# Patient Record
Sex: FEMALE | Race: WHITE
[De-identification: names, ages, dates, MRNs, and addresses within clinical notes are randomized per-mention and may not be internally consistent; named-entity substitution may affect disease eponyms.]

---

## 2017-02-01 ENCOUNTER — HOSPITAL ENCOUNTER (EMERGENCY)
Dept: HOSPITAL 74 - FER | Age: 42
Discharge: HOME | End: 2017-02-01
Payer: COMMERCIAL

## 2017-02-01 VITALS — BODY MASS INDEX: 28.3 KG/M2

## 2017-02-01 VITALS — SYSTOLIC BLOOD PRESSURE: 130 MMHG | DIASTOLIC BLOOD PRESSURE: 71 MMHG | TEMPERATURE: 97.9 F | HEART RATE: 88 BPM

## 2017-02-01 DIAGNOSIS — Z87.442: ICD-10-CM

## 2017-02-01 DIAGNOSIS — Z87.440: ICD-10-CM

## 2017-02-01 DIAGNOSIS — R30.0: Primary | ICD-10-CM

## 2017-02-01 LAB
COLOR UR: YELLOW
PH UR: 7 [PH] (ref 4.5–8)
RBC # UR STRIP: (no result) /UL
SP GR UR: 1.01 (ref 1–1.02)
UROBILINOGEN UR STRIP-MCNC: (no result) MG/DL (ref 0.2–1)

## 2017-02-01 NOTE — PDOC
History of Present Illness





- History of Present Illness


Initial Comments: 





02/01/17 20:41


The patient is a 41 year old female, with a significant past medical history of 

UTI and kidney stones, who presents to the emergency department with burning on 

urination and suprapubic discomfort since yesterday. She states she finished a 

10 day dose of amoxicillin for a UTI 3 days ago and felt her symptoms resolve, 

however, reports her dysuria returned at 4am this morning with new onset of 

bladder pain. She also reports a couple episodes of watery, brown diarrhea 

yesterday. She reports starting ortho tri cyclen-low 2 months ago. She also 

reports recently being tested for STDs and was negative. She states she has had 

adverse reactions to macrobid and usually takes amoxicillin if needed. 





LMP: 1/12/17





She denies chest pain, shortness of breath, headache and dizziness. She denies 

fever, chills, nausea, vomit, diarrhea and constipation. She denies frequency, 

urgency and hematuria. 





OB/Gyn - Dr. Gimenez





<Geetha Orellana - Last Filed: 02/01/17 20:41>





<Aurora Bagley - Last Filed: 02/02/17 03:33>





- General


Chief Complaint: Urinary Problem


Stated Complaint: burning on urination


Time Seen by Provider: 02/01/17 19:33





Past History





<Geetha Orellana - Last Filed: 02/01/17 20:41>





- Past Medical History


Other medical history: denies





- Psycho/Social/Smoking Cessation Hx


Anxiety: No


Suicidal Ideation: No


Smoking History: Never smoked


Have you smoked in the past 12 months: No


Hx Alcohol Use: No


Drug/Substance Use Hx: No


Substance Use Type: None





<Aurora Bagley - Last Filed: 02/02/17 03:33>





- Past Medical History


Allergies/Adverse Reactions: 


 Allergies











Allergy/AdvReac Type Severity Reaction Status Date / Time


 


Sulfa (Sulfonamide Allergy   Verified 02/01/17 19:31





Antibiotics)     











Home Medications: 


Ambulatory Orders





Norgestimate-Ethinyl Estradiol [Ortho Tri-Cyclen Lo Tablet] 1 each PO DAILY 02/ 01/17 











**Review of Systems





- Review of Systems


Able to Perform ROS?: Yes


Comments:: 





02/01/17 20:41





CONSTITUTIONAL: 


Absent: fever, chills, diaphoresis, generalized weakness, malaise, loss of 

appetite


HEENT: 


Absent: rhinorrhea, nasal congestion, throat pain, throat swelling, difficulty 

swallowing,mouth swelling, ear pain, eye pain, visual Changes


CARDIOVASCULAR: 


Absent: chest pain, syncope, palpitations, irregular heart rate, lightheadedness

, peripheral


edema


RESPIRATORY: 


Absent: cough, shortness of breath, dyspnea with exertion, orthopnea, wheezing, 

stridor,


hemoptysis


GASTROINTESTINAL:


Absent: abdominal distension, nausea, vomiting, diarrhea, constipation, melena, 

hematochezia


GENITOURINARY: 


(+) dysuria, suprapubic pain. Absent: frequency, urgency, hesitancy, hematuria, 

flank pain, genital pain


MUSCULOSKELETAL: 


Absent: myalgia, arthralgia, joint swelling


SKIN: 


Absent: rash, itching, pallor


HEMATOLOGIC/IMMUNOLOGIC: 


Absent: easy bleeding, easy bruising, lymphadenopathy, frequent infections


ENDOCRINE:


Absent: unexplained weight gain, unexplained weight loss, heat intolerance, 

cold intolerance


NEUROLOGIC: 


Absent: headache, focal weakness or paresthesias, dizziness, unsteady gait, 

seizure, mental


status changes, bladder or bowel incontinence


PSYCHIATRIC: 


Absent: anxiety, depression, suicidal or homicidal ideation, hallucinations.





<Geetha Orellana - Last Filed: 02/01/17 20:41>





*Physical Exam





- Vital Signs


 Last Vital Signs











Temp Pulse Resp BP Pulse Ox


 


 97.9 F   88   16   130/71   100 


 


 02/01/17 19:31  02/01/17 19:31  02/01/17 19:31  02/01/17 19:31  02/01/17 19:31














- Physical Exam


Comments: 





02/01/17 20:42





GENERAL: The patient is awake, alert, and fully oriented, in no acute distress.


HEAD: Normal with no signs of trauma.


EYES: Pupils equal, round and reactive to light, extraocular movements intact, 

sclera anicteric, conjunctiva clear with no pallor.


ENT: Ears normal, nares patent, oropharynx clear without exudates. Moist mucous 

membranes.


NECK: Normal range of motion, supple without lymphadenopathy, JVD, or masses.


LUNGS: Breath sounds equal, clear to auscultation bilaterally. No wheeze/

crackles.


HEART: Regular rate and rhythm, normal S1 and S2 without murmur or rub.


ABDOMEN: (+) mildly ttp at the LLQ. Soft/nondistended. BS wnl. No guarding or 

rebound. No palpable masses. No hepatosplenomegaly.


EXTREMITIES: Normal range of motion, no edema. No clubbing or cyanosis. No cords

, erythema, or tenderness.


NEUROLOGICAL: Cranial nerves II through XII grossly intact. Normal speech, 

normal gait.


PSYCH: Normal mood, normal affect.


SKIN: Warm, Dry, normal turgor, no rashes or lesions noted.





<Geetha Orellana - Last Filed: 02/01/17 20:41>





- Vital Signs


 Last Vital Signs











Temp Pulse Resp BP Pulse Ox


 


 97.9 F   88   16   130/71   100 


 


 02/01/17 19:31  02/01/17 19:31  02/01/17 19:31  02/01/17 19:31  02/01/17 19:31














<Aurora Bagley - Last Filed: 02/02/17 03:33>





ED Treatment Course





- ADDITIONAL ORDERS


Additional order review: 


 Laboratory  Results











  02/01/17





  19:36


 


Urine Color  Yellow


 


Urine Appearance  Clear


 


Urine pH  7.0  D


 


Ur Specific Gravity  1.010


 


Urine Protein  Negative


 


Urine Glucose (UA)  Negative


 


Urine Ketones  Negative


 


Urine Blood  1+ H


 


Urine Nitrite  Negative


 


Urine Bilirubin  Negative


 


Urine Urobilinogen  0.2 e.u/dl


 


Ur Leukocyte Esterase  Trace














<Geetha Orellana - Last Filed: 02/01/17 20:41>





- ADDITIONAL ORDERS


Additional order review: 


 Laboratory  Results











  02/01/17





  19:36


 


Urine Color  Yellow


 


Urine Appearance  Clear


 


Urine pH  7.0  D


 


Ur Specific Gravity  1.010


 


Urine Protein  Negative


 


Urine Glucose (UA)  Negative


 


Urine Ketones  Negative


 


Urine Blood  1+ H


 


Urine Nitrite  Negative


 


Urine Bilirubin  Negative


 


Urine Urobilinogen  0.2 e.u/dl


 


Ur Leukocyte Esterase  Trace














<Aurora Bagley - Last Filed: 02/02/17 03:33>





Progress Note





- Progress Note


Progress Note: 


Documentation has been prepared under my direction and personally reviewed by 

me in its entirety. I attest that this documented accurately reflects all work, 

treatment, procedures and medical decision making performed by me.





<Aurora Bagley - Last Filed: 02/02/17 03:33>





Medical Decision Making





- Medical Decision Making


As noted above, this 41-year-old woman presents with burning on urination and 

suprapubic discomfort. Patient has just completed a course of amoxicillin for 

the symptoms (patient will but not take any other antibiotic). This was 

prescribed by her gynecologist who has called her to tell her that the urine 

culture did not show significant amounts of bacteria. Although the patient has 

a history of UTI and renal stones in the past, she has never been evaluated by 

a urologist. Exam as noted above.


Urinalysis shows no clear evidence of UTI; microscopic exam shows 0-2 RBC, 5-8 

WBC, few epithelial cells, few bacteria. Urine has been sent for culture and 

sensitivity.





Results discussed with the patient. There is no reason to begin empirical 

antibiotic treatment of her dysuria. Results of urine culture and sensitivity 

will determine if antibiotics are started again. Meanwhile, the patient should 

drink plenty of water and Pyridium 200 mg 3 times a day for 2 days will be 

prescribed.


Since patient has never seen a urologist, she will be given referral 

information for Dr. Rechtschaffen group with whom she should follow-up within 

the next several days. Patient states that she is leaving for [place] on vacation 

in approximately 10 days. She should call the urologist's office tomorrow to 

make an appointment. She should return to the ER if she has any increase in 

severity of her symptoms











<Aurora Bagley - Last Filed: 02/02/17 03:33>





*DC/Admit/Observation/Transfer





- Attestations


Scribe Attestion: 





02/01/17 20:42





Documentation prepared by Geetha Orellana, acting as medical scribe for Aurora Bagley MD





<Geetha Orellana - Last Filed: 02/01/17 20:41>





<Aurora Bagley - Last Filed: 02/02/17 03:33>


Diagnosis at time of Disposition: 


 Dysuria





- Discharge Dispostion


Disposition: HOME


Condition at time of disposition: Stable





- Referrals


Referrals: 


Kenneth Payne MD [Staff Physician] - 





- Patient Instructions


Printed Discharge Instructions:  DI for Dysuria -- Adult


Additional Instructions: 


drink plenty of fluids


pyridium 200mg 3 times a day for 2 days


return to ER if pain worsens


followup with urologist(Dr Elmer stock) within 1 week

## 2017-05-10 ENCOUNTER — HOSPITAL ENCOUNTER (INPATIENT)
Dept: HOSPITAL 74 - FER | Age: 42
LOS: 3 days | Discharge: HOME | DRG: 263 | End: 2017-05-13
Attending: INTERNAL MEDICINE | Admitting: INTERNAL MEDICINE
Payer: COMMERCIAL

## 2017-05-10 VITALS — BODY MASS INDEX: 26.8 KG/M2

## 2017-05-10 DIAGNOSIS — N39.0: ICD-10-CM

## 2017-05-10 DIAGNOSIS — R00.0: ICD-10-CM

## 2017-05-10 DIAGNOSIS — K80.00: Primary | ICD-10-CM

## 2017-05-10 LAB
ALBUMIN SERPL-MCNC: 4 G/DL (ref 3.5–5)
ALP SERPL-CCNC: 54 U/L (ref 32–92)
ALT SERPL-CCNC: 11 U/L (ref 10–40)
ANION GAP SERPL CALC-SCNC: 8 MMOL/L (ref 8–16)
AST SERPL-CCNC: 20 U/L (ref 10–42)
BACTERIA #/AREA URNS HPF: (no result) /HPF
BASOPHILS # BLD: 0.5 % (ref 0–2)
BILIRUB SERPL-MCNC: 0.6 MG/DL (ref 0.2–1)
CALCIUM SERPL-MCNC: 9 MG/DL (ref 8.4–10.2)
CO2 SERPL-SCNC: 25 MMOL/L (ref 22–28)
COLOR UR: YELLOW
CREAT SERPL-MCNC: 0.6 MG/DL (ref 0.6–1.3)
DEPRECATED RDW RBC AUTO: 12.6 % (ref 11.6–15.6)
EOSINOPHIL # BLD: 0.4 % (ref 0–4.5)
GLUCOSE SERPL-MCNC: 110 MG/DL (ref 74–106)
LEUKOCYTE ESTERASE UR QL STRIP.AUTO: (no result)
MCH RBC QN AUTO: 29.5 PG (ref 25.7–33.7)
MCHC RBC AUTO-ENTMCNC: 33.9 G/DL (ref 32–36)
MCV RBC: 87.2 FL (ref 80–96)
NEUTROPHILS # BLD: 79.7 % (ref 42.8–82.8)
PH UR: 7.5 [PH] (ref 4.5–8)
PLATELET # BLD AUTO: 297 K/MM3 (ref 134–434)
PMV BLD: 8.4 FL (ref 7.5–11.1)
PROT SERPL-MCNC: 7.4 G/DL (ref 6.4–8.3)
RBC # BLD AUTO: (no result) /HPF (ref 0–3)
RBC # UR STRIP: (no result) /UL
SP GR UR: 1.01 (ref 1–1.02)
UROBILINOGEN UR STRIP-MCNC: (no result) MG/DL (ref 0.2–1)
WBC # BLD AUTO: 13.8 K/MM3 (ref 4–10.8)
WBC # UR AUTO: (no result) /UL (ref 3–5)

## 2017-05-10 NOTE — PDOC
History of Present Illness





- General


History Source: Patient


Exam Limitations: No Limitations





- History of Present Illness


Initial Comments: 


05/10/17 21:02


The patient is a 41 year old female, with a significant past medical history of 

gallstones, who presents to the emergency department complaining of RUQ for 

approximately 1 day. The patient reports the pain radiates into her back. She 

reports an associated subjective fever, chills, and body aches. The patient 

reports taking 1000 mg tylenol every 7-8 hours and 600 mg ibuprofen, every 4 

hours, with mild relief of symptoms. Patient states she stayed at home from 

work today due to symptoms, and reports hydrating throughout the day. The 

patient denies any nausea, vomiting, diarrhea, or constipation. The patient 

denies any chest pain, shortness of breath, diaphoresis, or palpitations. The 

patient denies any cough, headache, or dizziness





PAST MEDICAL HISTORY:  Gallstones


 


PAST SURGICAL HISTORY:  No significant history


 


FAMILY HISTORY:  No pertinent history


 


SOCIAL HISTORY:  Pt lives with family and is employed.


 


MEDICATIONS:  Reviewed


 


ALLERGIES:  As per nursing notes


 


 


General:  Yes: +fever, +chills, +body aches. No weakness, no weight loss


HEENT: No change in vision. No sore throat. No ear pain


CardioVascular:  No chest pain or shortness of breath


Respiratory: No cough, or wheezing.


Gastrointestinal:  Yes: +RUQ pain radiating into right back. No nausea, vomiting

, diarrhea or constipation.  No rectal bleeding


Genitourinary:  No dysuria, hematuria, or frequency


Musculoskeletal: No joint or muscle swelling


Neurologic: No headache, vertigo, dizziness or loss of consciousness


Psychiatric: No depression


Skin: No rashes or easy bruising


Endocrine: No increased thirst or abnormal weight change


Allergic: No skin or latex allergy


All other systems reviewed and normal


 


 


General: Well-nourished well-developed individual, no acute distress


HEENT: Dry mucous membranes. Throat: Normal, tonsils normal, no erythema or 

exudate


Neck: Supple, no meningeal signs, no lymphadenopathy


Eyes: Pupils equal reactive and round, extraocular motion intact


Chest: Nontender to palpation


Cardiac: Tachycardic, but regular rhythm. S1-S2 normal, no murmurs rubs or 

gallops


Respiratory: Lungs clear to auscultation bilateral


Abdomen: Tenderness on palpation to the RUQ, but no guarding or rebound. Soft, 

nondistended, normal bowel sounds, nontender to palpation in all other quadrants


Extremities: Warm, dry, no cyanosis, clubbing, or edema


Skin: No rashes


Neuro: Alert and oriented x3, nonfocal exam, grossly intact, normal gait


Psych: Normal mood and affect








<Jose F Sims - Last Filed: 05/10/17 21:45>





- General


History Source: Patient


Exam Limitations: No Limitations





- History of Present Illness


Initial Comments: 





05/10/17 23:22





A portion of this note was documented by scribe services under my direction. I 

have reviewed the details of the note, within reason, and agree with the 

documentation.  The case summary and management plan written by me. 








Assessment and plan: This is a 41-year-old female who comes in complaining of 

right upper quadrant pain. Patient was also noted to have a temperature of 103 

in the emergency room. Patient's lab reveals a white count of the left shift. 

Patient had an ultrasound of her gallbladder done that shows some questionable 

thickening of the gallbladder wall and a stone lodged in the neck of the 

gallbladder however no pericholecystic fluid or ductal dilation. Patient also 

did have an incidental finding of a hypokalemic very small spot on her liver 

that will need to be worked at some time most likely as an outpatient.





Discussed with Dr. Stock the general surgeon who well see patient in the 

morning regarding probable removal of her gallbladder. Patient was then 

admitted to Dr. Andrea PARMAR. Discussed case with  immediately will admit 

patient tonight and see patient in the morning as well.





<Lexie Ibarra I - Last Filed: 05/10/17 23:24>





- General


Chief Complaint: Pain, Acute


Stated Complaint: RLQ PAIN AN DFVER


Time Seen by Provider: 05/10/17 20:25





Past History





<Jose F Sims - Last Filed: 05/10/17 21:45>





- Psycho/Social/Smoking Cessation Hx


Anxiety: No


Suicidal Ideation: No


Smoking History: Never smoked


Have you smoked in the past 12 months: No


Hx Alcohol Use: No


Drug/Substance Use Hx: No


Substance Use Type: None





<Lexie Ibarra - Last Filed: 05/10/17 23:24>





- Past Medical History


Allergies/Adverse Reactions: 


 Allergies











Allergy/AdvReac Type Severity Reaction Status Date / Time


 


Sulfa (Sulfonamide Allergy   Verified 02/01/17 19:31





Antibiotics)     


 


ciprofloxacin AdvReac   Verified 05/10/17 21:09


 


latex AdvReac   Verified 05/10/17 21:09


 


nitrofurantoin AdvReac  Difficulty Verified 05/10/17 21:09





[From Macrobid]   Breathing  


 


nitrofurantoin AdvReac  Difficulty Verified 05/10/17 21:09





macrocrystalline   Breathing  





[From Macrobid]     











Home Medications: 


Ambulatory Orders





Norgestimate-Ethinyl Estradiol [Ortho Tri-Cyclen Lo Tablet] 1 each PO DAILY 02/ 01/17 


Cranberry 0 mg PO DAILY 05/10/17 


Multivitamin [Poly-Vitamin] 1 each PO DAILY 05/10/17 











*Physical Exam





- Vital Signs


 Last Vital Signs











Temp Pulse Resp BP Pulse Ox


 


 103.2 F H  130 H  20   121/75   100 


 


 05/10/17 20:39  05/10/17 20:24  05/10/17 20:24  05/10/17 20:56  05/10/17 20:24














<Jose F Sims - Last Filed: 05/10/17 21:45>





**Heart Score/ECG Review





- ECG Impressions


Comment:: 





05/10/17 21:10


Vent Rate: 138 bpm


IMPRESSION: Sinus tachycardia. Nonspecific ST and T wave abnormality. 





<Jose F Sims - Last Filed: 05/10/17 21:45>





ED Treatment Course





- LABORATORY


CBC & Chemistry Diagram: 


 05/10/17 20:45





 05/10/17 20:45





- ADDITIONAL ORDERS


Additional order review: 


 Laboratory  Results











  05/10/17





  20:40


 


Urine Color  Yellow


 


Urine Appearance  Clear


 


Urine pH  7.5


 


Ur Specific Gravity  1.010


 


Urine Protein  Negative


 


Urine Glucose (UA)  Negative


 


Urine Ketones  Negative


 


Urine Blood  1+ H


 


Urine Nitrite  Negative


 


Urine Bilirubin  Negative


 


Urine Urobilinogen  0.2 e.u/dl


 


Ur Leukocyte Esterase  1+ H


 


Urine RBC  0-2


 


Urine WBC  5-10


 


Urine Bacteria  Few


 


Urine HCG, Qual  Negative














<Jose F Sims - Last Filed: 05/10/17 21:45>





- LABORATORY


CBC & Chemistry Diagram: 


 05/10/17 20:45





 05/10/17 20:45





<Lexie Ibarra - Last Filed: 05/10/17 23:24>





*DC/Admit/Observation/Transfer





- Attestations


Scribe Attestion: 





05/10/17 21:02





Documentation prepared by Jose F Sims, acting as medical scribe for 

Lexie Ibarra MD.





<Jose F Sims - Last Filed: 05/10/17 21:45>





- Discharge Dispostion


Admit: Yes





<Lexie Ibarra - Last Filed: 05/10/17 23:24>


Diagnosis at time of Disposition: 


Cholelithiasis with acute cholecystitis


Qualifiers:


 Cholelithiasis location: gallbladder Biliary obstruction: with biliary 

obstruction Qualified Code(s): K80.01 - Calculus of gallbladder with acute 

cholecystitis with obstruction





- Discharge Dispostion


Condition at time of disposition: Fair

## 2017-05-11 LAB
INR BLD: 1.16 (ref 0.82–1.09)
PT PNL PPP: 13 SEC (ref 10.2–13)

## 2017-05-11 RX ADMIN — PANTOPRAZOLE SODIUM SCH MG: 40 INJECTION, POWDER, FOR SOLUTION INTRAVENOUS at 09:33

## 2017-05-11 RX ADMIN — PIPERACILLIN SODIUM,TAZOBACTAM SODIUM SCH MLS/HR: 3; .375 INJECTION, POWDER, FOR SOLUTION INTRAVENOUS at 17:30

## 2017-05-11 RX ADMIN — ACETAMINOPHEN ONE MG: 325 TABLET ORAL at 06:30

## 2017-05-11 RX ADMIN — HYDROMORPHONE HYDROCHLORIDE PRN MG: 1 INJECTION, SOLUTION INTRAMUSCULAR; INTRAVENOUS; SUBCUTANEOUS at 05:43

## 2017-05-11 RX ADMIN — PIPERACILLIN SODIUM,TAZOBACTAM SODIUM SCH MLS/HR: 3; .375 INJECTION, POWDER, FOR SOLUTION INTRAVENOUS at 15:16

## 2017-05-11 RX ADMIN — ACETAMINOPHEN ONE MG: 325 TABLET ORAL at 06:50

## 2017-05-11 RX ADMIN — HYDROMORPHONE HYDROCHLORIDE PRN MG: 1 INJECTION, SOLUTION INTRAMUSCULAR; INTRAVENOUS; SUBCUTANEOUS at 15:15

## 2017-05-11 RX ADMIN — ACETAMINOPHEN PRN MG: 10 INJECTION, SOLUTION INTRAVENOUS at 21:25

## 2017-05-11 NOTE — CON.CARD
Consult


Consult Specialty:: Cardiology


Referred by:: Dr. Morris


Reason for Consultation:: Preop evaluation





- History of Present Illness


Chief Complaint: RUQ pain


History of Present Illness: 


41F admitted with fever, RUQ pain, elevated WBC and US showing gallstones and 

findings in GB c/w acute cholecystitis.


Plan is for surgery this evening.





Called to evaluate for sinus tach.





Patient has no cardiac history: denies CAD, prior MI or syncope. No h/o CHF.


Several years ago had been referred to cardiologist after being admitted with 

UTI at [facility]. In that setting had elevated heart rate, 

which sounds like sinus tachycardia. Her work up at that time was reportedly 

unremarkable.





She denies exertional CP, SOB, Palps, LE edema, PND or orthopnea.





- History Source


History Provided By: Patient, Medical Record


Limitations to Obtaining History: No Limitations





- Past Medical History


Cardio/Vascular: No: AFIB, Aneurysm, Aortic Insufficiency, Aortic Stenosis, CAD

, CHF, Deep Vein Thrombosis, HTN, Hyperlipdemia, MI, Mitral Insufficiency, 

Mitral Stenosis, Murmur, Pulmonary Hypertension, Other


Pulmonary: No: Asthma, Bronchitis, Cancer, COPD, O2 Dependent, Pneumonia, 

Previously Intubated, Pulmonary Embolus, Pulmonary Fibrosis, Sleep Apnea, Other


Gastrointestinal: No: Ascites, Cancer, Constipation, Crohn's Disease, 

Diverticulitis, Diverticulosis, Esophageal Varices, Gastritis, GERD, GI Bleed, 

Hemorrhoids, Hiatal Hernia, Inflamatory Bowel Disease, Irritable Bowel Disease, 

Pancreatitis, Peptic Ulcer Disease, Ulcerative Colitis, Other


Hepatobiliary: No: Cirrhosis, Cholelithiasis, Cholecystitis, Choledocholithiasis

, Hepatitis A, Hepatitis B, Hepatitis C, Other


Renal/: No: Renal Failure, Renal Inusuff, BPH, Cancer, Hematuria, Hemodialysis

, Neurogenic Bladder, Renal Calculi, UTI, Other


...LMP: 04/30/17


...Pregnant: No


Heme/Onc: No: Anemia, B12 Deficiency, Bleeding Disorder, Cancer, Current 

Chemotherapy, Current Radiation Therapy, Hemochromatosis, Hypercoaguable State, 

Myeloproliferative Synd, Sickle Cell Disease, Sickle Cell Trait, 

Thrombocytopenia, Other


Infectious Disease: No: AIDS, C-Diff, Herpes Zoster, HIV, MRSA, STD's, 

Tuberculosis, VREF, Other


Psych: No: Addictions, Anxiety, Bipolar, Depression, Panic, Psychosis, 

Schizophrenia, Other


Musculoskeletal: No: Bursitis, Chronic low back pain, Hemiparesis, Hemiplegia, 

Osteoarthritis, Paraplegia, Other


Rheumatology: No: Fibromyalgia, Gout, Lupus, Rheumatoid Arthritis, Sarcoidosis, 

Vasculitis, Other


ENT: No: Allergic Rhinitis, Sinusitis, Other


Endocrine: No: Kayode's Disease, Cushing's Disease, Diabetes Insipidus, 

Diabetes Mellitus, Hyperparathyroidism, Hyperthyroidism, Hypothyroidism, 

Osteopenia, SIADH, Other


Dermatology: No: Basal Cell, Cellulitis, Eczema, Melanoma, Psoriasis, Squamous 

Cell, Other


Additional Medical History: Benign fatty tissue removed from breast 18 years ago





- Past Surgical History


Additional Surgical History: as above.  Denies bleeding tendencies/disorders.





- Alcohol/Substance Use


Hx Alcohol Use: No


History of Substance Use: reports: None





- Smoking History


Smoking history: Never smoked





- Social History


Occupation: rehabbing stroke patients.


History of Recent Travel: No





Home Medications





- Allergies


Allergies/Adverse Reactions: 


 Allergies











Allergy/AdvReac Type Severity Reaction Status Date / Time


 


Sulfa (Sulfonamide Allergy   Verified 02/01/17 19:31





Antibiotics)     


 


ciprofloxacin AdvReac   Verified 05/10/17 21:09


 


latex AdvReac   Verified 05/10/17 21:09


 


nitrofurantoin AdvReac  Difficulty Verified 05/10/17 21:09





[From Macrobid]   Breathing  


 


nitrofurantoin AdvReac  Difficulty Verified 05/10/17 21:09





macrocrystalline   Breathing  





[From Macrobid]     














- Home Medications


Home Medications: 


Ambulatory Orders





Norgestimate-Ethinyl Estradiol [Ortho Tri-Cyclen Lo Tablet] 1 each PO DAILY 02/ 01/17 


Cranberry 0 mg PO DAILY 05/10/17 


Multivitamin [Poly-Vitamin] 1 each PO DAILY 05/10/17 











Family Disease History





- Family Disease History


Family History: Unremarkable (no early CAD or SCD)





Review of Systems


Findings/Remarks: 


Denies exertional CP, SOB, palpitations, PND, orthopnea.


Denies syncope. 








- Review of Systems


Constitutional: reports: Chills


Cardiovascular: reports: No Symptoms


Respiratory: reports: No Symptoms


Gastrointestinal: reports: Abdominal Pain (RUQ pain and tenderness)


Genitourinary: reports: No Symptoms


Musculoskeletal: reports: No Symptoms


Integumentary: reports: No Symptoms


Neurological: reports: No Symptoms


Endocrine: reports: No Symptoms


Hematology/Lymphatic: reports: No Symptoms


Psychiatric: reports: No Symptoms





- Risk Factors


Known Risk Factors: No: Age, Diabetes Mellitus, Family History, Gender, 

Hypercholesterolemia, Hypertension, Physical Inactivity, Prior MI /Emb Stroke, 

Race, Smoking, Other


Vital Signs: 


 Vital Signs











Temperature  100.7 F H  05/11/17 14:15


 


Pulse Rate  120 H  05/11/17 14:15


 


Respiratory Rate  18   05/11/17 14:15


 


Blood Pressure  104/60   05/11/17 14:15


 


O2 Sat by Pulse Oximetry (%)  98   05/11/17 14:15











Constitutional: Yes: Well Nourished


Eyes: Yes: Conjunctiva Clear


HENT: Yes: Atraumatic, Normocephalic


Neck: Yes: Supple, Trachea Midline


Respiratory: Yes: CTA Bilaterally


Gastrointestinal: Yes: Other (+ Slaughter's sign. + tenderness RUQ.)


Cardiovascular: Yes: Regular Rate and Rhythm (S1, 2. RRR. No murmurs.)


JVD: No


Carotid Bruit: No


PMI: Non-Displaced


Heart Sounds: Yes: S1, S2 (RRR, no murmurs.)


Edema: No


Peripheral Pulses WNL: Yes


Integumentary: Yes: WNL


Neurological: Yes: Alert, Oriented


...Motor Strength: WNL


Psychiatric: Yes: WNL





- Other Data


Labs, Other Data: 


 INR, PTT











INR  1.16  (0.82-1.09)   05/11/17  07:50    








 Laboratory Tests











  05/10/17 05/10/17 05/10/17





  20:40 20:45 20:45


 


WBC   13.8 H 


 


Hgb   12.6 


 


Hct   37.2 


 


Plt Count   297 


 


INR   


 


Sodium    137


 


Potassium    3.6


 


BUN    5 L D


 


Creatinine    0.6  D


 


Creat Clearance w eGFR    > 60


 


Random Glucose    110 H


 


Lactic Acid   


 


Calcium    9.0


 


Total Bilirubin    0.6


 


AST    20


 


ALT    11


 


Alkaline Phosphatase    54  D


 


Total Protein    7.4


 


Albumin    4.0


 


Lipase    33


 


Urine Appearance  Clear  


 


Urine pH  7.5  


 


Ur Specific Gravity  1.010  


 


Urine Blood  1+ H  














  05/10/17 05/11/17





  21:00 07:50


 


WBC  


 


Hgb  


 


Hct  


 


Plt Count  


 


INR   1.16


 


Sodium  


 


Potassium  


 


BUN  


 


Creatinine  


 


Creat Clearance w eGFR  


 


Random Glucose  


 


Lactic Acid  0.957 


 


Calcium  


 


Total Bilirubin  


 


AST  


 


ALT  


 


Alkaline Phosphatase  


 


Total Protein  


 


Albumin  


 


Lipase  


 


Urine Appearance  


 


Urine pH  


 


Ur Specific Gravity  


 


Urine Blood  











Sinus tachycardia 138bpm NSST changes ( 5-10-17)


Echo: Pending





Imaging





- Results


Ultrasound: Report Reviewed


EKG: Image Reviewed





Problem List





- Problems


(1) Cholecystitis, acute with cholelithiasis


Assessment/Plan: 


-fever, leukocytosis, RUQ pain and US c/w inflammed GB with stones.


-For cholecystectomy today


-There are no cardiac contraindications, can proceed without need for further 

cardiac diagnostic testing.


She has no anginal symptoms, no CHF hx and there is no murmur of AS.


She would be considered at low operative risk for a low to intermediate risk 

surgery.


Code(s): K80.00 - CALCULUS OF GALLBLADDER W ACUTE CHOLECYST W/O OBSTRUCTION   

Qualifiers: 


     Cholelithiasis location: gallbladder     Biliary obstruction: with biliary 

obstruction     Qualified Code(s): K80.01 - Calculus of gallbladder with acute 

cholecystitis with obstruction  





(2) Sinus tachycardia


Assessment/Plan: 


-physiologic response to fever, infection and pain


-hydrate


-antibiotics as per surgery


-For cholecystectomy


-analgesia


-echo is planned, but should not delay surgery as physical exam does not 

suggest any significant valve disease. 


Code(s): R00.0 - TACHYCARDIA, UNSPECIFIED

## 2017-05-11 NOTE — HP
Admitting History and Physical





- Primary Care Physician


PCP: Claudy Morris





- Admission


History of Present Illness: 


41 year old female, with a significant past medical history of gallstones, who 

presents to the emergency department complaining of RUQ for approximately 1 

day. The patient reports the pain radiates into her back. She reports an 

associated subjective fever, chills, and body aches. The patient reports taking 

1000 mg tylenol every 7-8 hours and 600 mg ibuprofen, every 4 hours, with mild 

relief of symptoms





-





- Past Medical History


...LMP: 04/30/17


...Pregnant: No





- Advance Directives


Advance Directives: Yes: Living Will





- Smoking History


Smoking history: Never smoked


Have you smoked in the past 12 months: No





- Alcohol/Substance Use


Hx Alcohol Use: No





Home Medications





- Allergies


Allergies/Adverse Reactions: 


 Allergies











Allergy/AdvReac Type Severity Reaction Status Date / Time


 


Sulfa (Sulfonamide Allergy   Verified 02/01/17 19:31





Antibiotics)     


 


ciprofloxacin AdvReac   Verified 05/10/17 21:09


 


latex AdvReac   Verified 05/10/17 21:09


 


nitrofurantoin AdvReac  Difficulty Verified 05/10/17 21:09





[From Macrobid]   Breathing  


 


nitrofurantoin AdvReac  Difficulty Verified 05/10/17 21:09





macrocrystalline   Breathing  





[From Macrobid]     














- Home Medications


Home Medications: 


Ambulatory Orders





Norgestimate-Ethinyl Estradiol [Ortho Tri-Cyclen Lo Tablet] 1 each PO DAILY 02/ 01/17 


Cranberry 0 mg PO DAILY 05/10/17 


Multivitamin [Poly-Vitamin] 1 each PO DAILY 05/10/17 











Physical Examination


Vital Signs: 


 Vital Signs











Temperature  100.0 F H  05/11/17 11:41


 


Pulse Rate  120 H  05/11/17 06:32


 


Respiratory Rate  18   05/11/17 06:32


 


Blood Pressure  102/59   05/11/17 06:32


 


O2 Sat by Pulse Oximetry (%)  100   05/11/17 08:58











Constitutional: Yes: No Distress


HENT: Yes: Atraumatic


Neck: Yes: Supple


Cardiovascular: Yes: Regular Rate and Rhythm


Respiratory: Yes: CTA Bilaterally


Gastrointestinal: Yes: Normal Bowel Sounds


Extremities: Yes: WNL


Neurological: Yes: Alert, Oriented





Problem List





- Problems


(1) Cholecystitis, acute with cholelithiasis


Assessment/Plan: 


npo, ivf


iv abx


surgery consult


Code(s): K80.00 - CALCULUS OF GALLBLADDER W ACUTE CHOLECYST W/O OBSTRUCTION   

Qualifiers: 


     Cholelithiasis location: gallbladder     Biliary obstruction: with biliary 

obstruction     Qualified Code(s): K80.01 - Calculus of gallbladder with acute 

cholecystitis with obstruction  








Assessment/Plan


 Laboratory Tests











  05/10/17 05/10/17 05/10/17





  20:40 20:45 20:45


 


WBC   13.8 H 


 


RBC   4.27 


 


Hgb   12.6 


 


Hct   37.2 


 


MCV   87.2 


 


MCHC   33.9 


 


RDW   12.6 


 


Plt Count   297 


 


MPV   8.4 


 


Neutrophils %   79.7 


 


Lymphocytes %   13.7  D 


 


Monocytes %   5.7 


 


Eosinophils %   0.4 


 


Basophils %   0.5 


 


INR   


 


Sodium    137


 


Potassium    3.6


 


Chloride    104


 


Carbon Dioxide    25


 


Anion Gap    8


 


BUN    5 L D


 


Creatinine    0.6  D


 


Creat Clearance w eGFR    > 60


 


Random Glucose    110 H


 


Lactic Acid   


 


Calcium    9.0


 


Total Bilirubin    0.6


 


AST    20


 


ALT    11


 


Alkaline Phosphatase    54  D


 


Total Protein    7.4


 


Albumin    4.0


 


Lipase    33


 


Urine Color  Yellow  


 


Urine Appearance  Clear  


 


Urine pH  7.5  


 


Ur Specific Gravity  1.010  


 


Urine Protein  Negative  


 


Urine Glucose (UA)  Negative  


 


Urine Ketones  Negative  


 


Urine Blood  1+ H  


 


Urine Nitrite  Negative  


 


Urine Bilirubin  Negative  


 


Urine Urobilinogen  0.2 e.u/dl  


 


Ur Leukocyte Esterase  1+ H  


 


Urine RBC  0-2  


 


Urine WBC  5-10  


 


Urine Bacteria  Few  


 


Urine HCG, Qual  Negative  


 


Blood Type   


 


Antibody Screen   














  05/10/17 05/11/17 05/11/17





  21:00 07:50 10:55


 


WBC   


 


RBC   


 


Hgb   


 


Hct   


 


MCV   


 


MCHC   


 


RDW   


 


Plt Count   


 


MPV   


 


Neutrophils %   


 


Lymphocytes %   


 


Monocytes %   


 


Eosinophils %   


 


Basophils %   


 


INR   1.16 


 


Sodium   


 


Potassium   


 


Chloride   


 


Carbon Dioxide   


 


Anion Gap   


 


BUN   


 


Creatinine   


 


Creat Clearance w eGFR   


 


Random Glucose   


 


Lactic Acid  0.957  


 


Calcium   


 


Total Bilirubin   


 


AST   


 


ALT   


 


Alkaline Phosphatase   


 


Total Protein   


 


Albumin   


 


Lipase   


 


Urine Color   


 


Urine Appearance   


 


Urine pH   


 


Ur Specific Gravity   


 


Urine Protein   


 


Urine Glucose (UA)   


 


Urine Ketones   


 


Urine Blood   


 


Urine Nitrite   


 


Urine Bilirubin   


 


Urine Urobilinogen   


 


Ur Leukocyte Esterase   


 


Urine RBC   


 


Urine WBC   


 


Urine Bacteria   


 


Urine HCG, Qual   


 


Blood Type    O POSITIVE


 


Antibody Screen    Negative








Active Medications











Generic Name Dose Route Start Last Admin





  Trade Name Freq  PRN Reason Stop Dose Admin


 


Acetaminophen  1,000 mg 05/11/17 08:35  





  Ofirmev Injection -  IVPB   





  Q6H PRN   


 


Hydromorphone HCl  1 mg 05/11/17 03:11  





  Dilaudid Injection -  IVPB   





  Q4H PRN   


 


Sodium Chloride  1,000 mls @ 75 mls/hr 05/11/17 08:30 05/11/17 09:36





  Normal Saline -  IV   75 mls/hr





  ASDIR AMI   Administration


 


Pantoprazole Sodium  40 mg 05/11/17 10:00 05/11/17 09:33





  Protonix 40mg Ivpb (Pre-Docked)  IVPB   40 mg





  DAILY AMI   Administration








PLAN


CARDIAC ECHO


CARDIOLOGY CLEARANCE FOR SURGERY

## 2017-05-11 NOTE — CONSULT
Consult


Consult Specialty:: infectious diseases


Reason for Consultation:: choleycystitis





- History of Present Illness


Chief Complaint: abd pain ruq


History of Present Illness: 


41 year old female, with a significant past medical history of gallstones,

admitted for  RUQ pain for approximately 1 day. The patient reports the pain 

radiates into her back. She reports an associated subjective fever, chills, and 

body aches. 


patient has taken pain medications without relief and came to the hospital


patient is otherwise relatively healthy


patient is diagnosed as having ac choleycystitis and plan is for surgery 

tomorrow 





- History Source


History Provided By: Patient


Limitations to Obtaining History: No Limitations





- Past Medical History


Cardio/Vascular: No: AFIB, Aneurysm, Aortic Insufficiency, Aortic Stenosis, CAD

, CHF, Deep Vein Thrombosis, HTN, Hyperlipdemia, MI, Mitral Insufficiency, 

Mitral Stenosis, Murmur, Pulmonary Hypertension, Other


Pulmonary: No: Asthma, Bronchitis, Cancer, COPD, O2 Dependent, Pneumonia, 

Previously Intubated, Pulmonary Embolus, Pulmonary Fibrosis, Sleep Apnea, Other


Gastrointestinal: No: Ascites, Cancer, Constipation, Crohn's Disease, 

Diverticulitis, Diverticulosis, Esophageal Varices, Gastritis, GERD, GI Bleed, 

Hemorrhoids, Hiatal Hernia, Inflamatory Bowel Disease, Irritable Bowel Disease, 

Pancreatitis, Peptic Ulcer Disease, Ulcerative Colitis, Other


Hepatobiliary: No: Cirrhosis, Cholelithiasis, Cholecystitis, Choledocholithiasis

, Hepatitis A, Hepatitis B, Hepatitis C, Other


Renal/: No: Renal Failure, Renal Inusuff, BPH, Cancer, Hematuria, Hemodialysis

, Neurogenic Bladder, Renal Calculi, UTI, Other


...LMP: 04/30/17


...Pregnant: No


Infectious Disease: No: AIDS, C-Diff, Herpes Zoster, HIV, MRSA, STD's, 

Tuberculosis, VREF, Other


Psych: No: Addictions, Anxiety, Bipolar, Depression, Panic, Psychosis, 

Schizophrenia, Other


Musculoskeletal: No: Bursitis, Chronic low back pain, Hemiparesis, Hemiplegia, 

Osteoarthritis, Paraplegia, Other


Rheumatology: No: Fibromyalgia, Gout, Lupus, Rheumatoid Arthritis, Sarcoidosis, 

Vasculitis, Other


ENT: No: Allergic Rhinitis, Sinusitis, Other


Endocrine: No: Norris's Disease, Cushing's Disease, Diabetes Insipidus, 

Diabetes Mellitus, Hyperparathyroidism, Hyperthyroidism, Hypothyroidism, 

Osteopenia, SIADH, Other


Dermatology: No: Basal Cell, Cellulitis, Eczema, Melanoma, Psoriasis, Squamous 

Cell, Other


Additional Medical History: Benign fatty tissue removed from breast 18 years ago





- Past Surgical History


Additional Surgical History: as above.  Denies bleeding tendencies/disorders.





- Alcohol/Substance Use


Hx Alcohol Use: No


History of Substance Use: reports: None





- Smoking History


Smoking history: Never smoked


Have you smoked in the past 12 months: No





- Social History


Occupation: rehabbing stroke patients.


History of Recent Travel: No





Home Medications





- Allergies


Allergies/Adverse Reactions: 


 Allergies











Allergy/AdvReac Type Severity Reaction Status Date / Time


 


Sulfa (Sulfonamide Allergy   Verified 02/01/17 19:31





Antibiotics)     


 


ciprofloxacin AdvReac   Verified 05/10/17 21:09


 


latex AdvReac   Verified 05/10/17 21:09


 


nitrofurantoin AdvReac  Difficulty Verified 05/10/17 21:09





[From Macrobid]   Breathing  


 


nitrofurantoin AdvReac  Difficulty Verified 05/10/17 21:09





macrocrystalline   Breathing  





[From Macrobid]     














- Home Medications


Home Medications: 


Ambulatory Orders





Norgestimate-Ethinyl Estradiol [Ortho Tri-Cyclen Lo Tablet] 1 each PO DAILY 02/ 01/17 


Cranberry 0 mg PO DAILY 05/10/17 


Multivitamin [Poly-Vitamin] 1 each PO DAILY 05/10/17 











Review of Systems





- Review of Systems


Constitutional: reports: No Symptoms


Eyes: reports: No Symptoms


HENT: reports: No Symptoms


Neck: reports: No Symptoms


Cardiovascular: reports: No Symptoms


Respiratory: reports: No Symptoms


Gastrointestinal: reports: Abdominal Pain, Other


Genitourinary: reports: No Symptoms


Musculoskeletal: reports: No Symptoms


Integumentary: reports: No Symptoms


Neurological: reports: No Symptoms


Endocrine: reports: No Symptoms


Hematology/Lymphatic: reports: No Symptoms


Psychiatric: reports: No Symptoms





Physical Exam


Vital Signs: 


 Vital Signs











Temperature  100.7 F H  05/11/17 14:15


 


Pulse Rate  120 H  05/11/17 14:15


 


Respiratory Rate  18   05/11/17 14:15


 


Blood Pressure  104/60   05/11/17 14:15


 


O2 Sat by Pulse Oximetry (%)  98   05/11/17 14:15











Constitutional: Yes: Well Nourished, Calm, Moderate Distress


Eyes: Yes: Conjunctiva Clear


HENT: Yes: Atraumatic


Neck: Yes: Supple, Trachea Midline


Cardiovascular: Yes: Regular Rate and Rhythm


Respiratory: Yes: Regular, CTA Bilaterally


Gastrointestinal: Yes: Soft, Hypoactive Bowel Sounds, Tenderness (ruq), Other


Musculoskeletal: Yes: WNL


Extremities: Yes: WNL


Neurological: Yes: Alert, Oriented


Psychiatric: Yes: Alert, Oriented





Imaging





- Results


Ultrasound: Report Reviewed, Image Reviewed





Assessment/Plan


 Problems


(1) Cholecystitis, acute with cholelithiasis


Code(s): K80.00 - CALCULUS OF GALLBLADDER W ACUTE CHOLECYST W/O OBSTRUCTION   

Qualifiers: 


     Cholelithiasis location: gallbladder     Biliary obstruction: with biliary 

obstruction     Qualified Code(s): K80.01 - Calculus of gallbladder with acute 

cholecystitis with obstruction  





plan





started patient on abx


for surgery


rest continue current mgmt

## 2017-05-11 NOTE — EKG
Test Reason : 

Blood Pressure : ***/*** mmHG

Vent. Rate : 138 BPM     Atrial Rate : 138 BPM

   P-R Int : 134 ms          QRS Dur : 080 ms

    QT Int : 274 ms       P-R-T Axes : 064 019 052 degrees

   QTc Int : 415 ms

 

SINUS TACHYCARDIA

NONSPECIFIC ST AND T WAVE ABNORMALITY

NO PREVIOUS ECGS AVAILABLE

Confirmed by ESSENCE FAUST MD ([id number]) on 5/11/2017 12:07:04 PM

 

Referred By: MD FOFANA           Confirmed By:ESSENCE FAUST MD

## 2017-05-11 NOTE — CONSULT
Consult


Reason for Consultation:: abdominal pain





- History of Present Illness


History of Present Illness: 


41 year old female, with a significant past medical history of gallstones, who 

presents to the emergency department complaining of RUQ for approximately 1 

day. The patient reports the pain radiates into her back. She reports an 

associated subjective fever, chills, and body aches. The patient reports taking 

1000 mg tylenol every 7-8 hours and 600 mg ibuprofen, every 4 hours, with mild 

relief of symptoms





- Past Medical History


...LMP: 04/30/17


...Pregnant: No





- Alcohol/Substance Use


Hx Alcohol Use: No





- Smoking History


Smoking history: Never smoked


Have you smoked in the past 12 months: No





Home Medications





- Allergies


Allergies/Adverse Reactions: 


 Allergies











Allergy/AdvReac Type Severity Reaction Status Date / Time


 


Sulfa (Sulfonamide Allergy   Verified 02/01/17 19:31





Antibiotics)     


 


ciprofloxacin AdvReac   Verified 05/10/17 21:09


 


latex AdvReac   Verified 05/10/17 21:09


 


nitrofurantoin AdvReac  Difficulty Verified 05/10/17 21:09





[From Macrobid]   Breathing  


 


nitrofurantoin AdvReac  Difficulty Verified 05/10/17 21:09





macrocrystalline   Breathing  





[From Macrobid]     














- Home Medications


Home Medications: 


Ambulatory Orders





Norgestimate-Ethinyl Estradiol [Ortho Tri-Cyclen Lo Tablet] 1 each PO DAILY 02/ 01/17 


Cranberry 0 mg PO DAILY 05/10/17 


Multivitamin [Poly-Vitamin] 1 each PO DAILY 05/10/17 











Physical Exam


Vital Signs: 


 Vital Signs











Temperature  99.1 F   05/11/17 00:56


 


Pulse Rate  126 H  05/11/17 00:56


 


Respiratory Rate  20   05/11/17 00:56


 


Blood Pressure  118/65   05/11/17 00:56


 


O2 Sat by Pulse Oximetry (%)  98   05/11/17 00:56














Imaging





- Results


Ultrasound: Report Reviewed





Problem List





- Problems


(1) Cholecystitis, acute with cholelithiasis


Code(s): K80.00 - CALCULUS OF GALLBLADDER W ACUTE CHOLECYST W/O OBSTRUCTION   

Qualifiers: 


     Cholelithiasis location: gallbladder     Biliary obstruction: with biliary 

obstruction     Qualified Code(s): K80.01 - Calculus of gallbladder with acute 

cholecystitis with obstruction  








Assessment/Plan


41 yr old female  with ?acute cholecystitis/ biliary colic


recommended cholecystectomy discussed the options with patient


will plan for this evening

## 2017-05-12 PROCEDURE — 0FT44ZZ RESECTION OF GALLBLADDER, PERCUTANEOUS ENDOSCOPIC APPROACH: ICD-10-PCS | Performed by: SURGERY

## 2017-05-12 RX ADMIN — PANTOPRAZOLE SODIUM SCH MG: 40 INJECTION, POWDER, FOR SOLUTION INTRAVENOUS at 09:43

## 2017-05-12 RX ADMIN — PIPERACILLIN SODIUM,TAZOBACTAM SODIUM SCH: 3; .375 INJECTION, POWDER, FOR SOLUTION INTRAVENOUS at 18:54

## 2017-05-12 RX ADMIN — PIPERACILLIN SODIUM,TAZOBACTAM SODIUM SCH MLS/HR: 3; .375 INJECTION, POWDER, FOR SOLUTION INTRAVENOUS at 02:00

## 2017-05-12 RX ADMIN — ACETAMINOPHEN PRN MG: 10 INJECTION, SOLUTION INTRAVENOUS at 18:50

## 2017-05-12 RX ADMIN — PIPERACILLIN SODIUM,TAZOBACTAM SODIUM SCH MLS/HR: 3; .375 INJECTION, POWDER, FOR SOLUTION INTRAVENOUS at 09:43

## 2017-05-12 NOTE — PN
Progress Note, Physician


Chief Complaint: 


ambulating, no acute distress


Feels "Little better"


No SOB


History of Present Illness: 


blood cultures remain negative





- Current Medication List


Current Medications: 


Active Medications





Acetaminophen (Ofirmev Injection -)  1,000 mg IVPB Q6H PRN


   Last Admin: 05/11/17 21:25 Dose:  1,000 mg


Hydromorphone HCl (Dilaudid Injection -)  1 mg IVPB Q4H PRN


   Last Admin: 05/11/17 15:15 Dose:  1 mg


Sodium Chloride (Normal Saline -)  1,000 mls @ 75 mls/hr IV ASDIR AMI


   Last Admin: 05/11/17 09:36 Dose:  75 mls/hr


Piperacillin Sod/Tazobactam Sod (Zosyn 3.375gm Ivpb (Pre-Docked))  50 mls @ 100 

mls/hr IVPB Q8H-IV AMI


   PRN Reason: Protocol


   Last Admin: 05/12/17 02:00 Dose:  100 mls/hr


Pantoprazole Sodium (Protonix 40mg Ivpb (Pre-Docked))  40 mg IVPB DAILY [facility]


   Last Admin: 05/11/17 09:33 Dose:  40 mg











- Objective


Vital Signs: 


 Vital Signs











Temperature  98.5 F   05/12/17 05:43


 


Pulse Rate  86   05/12/17 05:43


 


Respiratory Rate  19   05/12/17 08:18


 


Blood Pressure  80/44   05/12/17 05:43


 


O2 Sat by Pulse Oximetry (%)  100   05/12/17 08:18











Constitutional: Yes: No Distress, Calm


Eyes: Yes: Conjunctiva Clear


HENT: Yes: Atraumatic


Neck: Yes: Supple


Cardiovascular: Yes: Regular Rate and Rhythm, Other (no murmurs)


Respiratory: Yes: Regular, CTA Bilaterally (RUQ tenderness, no rebound or 

guarding)


Edema: No


Neurological: Yes: Alert, Oriented


...Motor Strength: WNL


Labs: 


 INR, PTT











INR  1.16  (0.82-1.09)   05/11/17  07:50    














Problem List





- Problems


(1) Cholecystitis, acute with cholelithiasis


Code(s): K80.00 - CALCULUS OF GALLBLADDER W ACUTE CHOLECYST W/O OBSTRUCTION   

Qualifiers: 


     Cholelithiasis location: gallbladder     Biliary obstruction: with biliary 

obstruction     Qualified Code(s): K80.01 - Calculus of gallbladder with acute 

cholecystitis with obstruction  





(2) Sinus tachycardia


Code(s): R00.0 - TACHYCARDIA, UNSPECIFIED








Assessment/Plan


Problem List





- Problems


(1) Cholecystitis, acute with cholelithiasis


Assessment/Plan: 


-fever, leukocytosis, RUQ pain and US c/w inflammed GB with stones.


-For cholecystectomy today, scheduled for 3pm


-There are no cardiac contraindications


She has no anginal symptoms, no CHF hx and there is no murmur of AS.


She would be considered at low operative risk for a low to intermediate risk 

surgery.


Code(s): K80.00 - CALCULUS OF GALLBLADDER W ACUTE CHOLECYST W/O OBSTRUCTION   

Qualifiers: 


     Cholelithiasis location: gallbladder     Biliary obstruction: with biliary 

obstruction     Qualified Code(s): K80.01 - Calculus of gallbladder with acute 

cholecystitis with obstruction  





(2) Sinus tachycardia


Assessment/Plan: 


-physiologic response to fever, infection and pain: improved with increased IV 

fluids yesterday.


-antibiotics as per surgery, on Zosyn


-For cholecystectomy today


-analgesia


-echo done, read pending but does should not delay surgery as there are no 

concerning murmurs/findings on exam


Code(s): R00.0 - TACHYCARDIA, UNSPECIFIED

## 2017-05-12 NOTE — PN
Progress Note, Physician


History of Present Illness: 


s/p surgery





- Current Medication List


Current Medications: 


Active Medications





Acetaminophen (Ofirmev Injection -)  1,000 mg IVPB Q6H PRN


   Last Admin: 05/11/17 21:25 Dose:  1,000 mg


Fentanyl (Sublimaze Injection -)  50 mcg IVPUSH [id number] PRN


   PRN Reason: PAIN


   Stop: 05/15/17 15:23


Hydromorphone HCl (Dilaudid Injection -)  1 mg IVPB Q4H PRN


   Last Admin: 05/11/17 15:15 Dose:  1 mg


Sodium Chloride (Normal Saline -)  1,000 mls @ 75 mls/hr IV ASDIR AMI


   Last Admin: 05/11/17 09:36 Dose:  75 mls/hr


Piperacillin Sod/Tazobactam Sod (Zosyn 3.375gm Ivpb (Pre-Docked))  50 mls @ 100 

mls/hr IVPB Q8H-IV AMI


   PRN Reason: Protocol


   Last Admin: 05/12/17 18:54 Dose:  Not Given


Lactated Ringer's (Lactated Ringers Solution)  1,000 mls @ 75 mls/hr IV ASDIR 

[facility]


Ketorolac Tromethamine (Toradol Injection -)  30 mg IVPUSH Q6H PRN


   PRN Reason: PAIN


   Stop: 05/17/17 18:10


Ondansetron HCl (Zofran Injection)  4 mg IVPUSH Q6H PRN


   PRN Reason: NAUSEA AND/OR VOMITING


   Stop: 05/12/17 21:23


Oxycodone/Acetaminophen (Percocet 5/325 -)  1 combo PO Q6H PRN


   PRN Reason: PAIN LEVEL 6-10


Pantoprazole Sodium (Protonix 40mg Ivpb (Pre-Docked))  40 mg IVPB DAILY [facility]


   Last Admin: 05/12/17 09:43 Dose:  40 mg











- Objective


Vital Signs: 


 Vital Signs











Temperature  99.3 F   05/12/17 14:13


 


Pulse Rate  106 H  05/12/17 14:13


 


Respiratory Rate  16   05/12/17 14:13


 


Blood Pressure  114/67   05/12/17 14:13


 


O2 Sat by Pulse Oximetry (%)  100   05/12/17 14:13











Constitutional: Yes: No Distress


HENT: Yes: Atraumatic


Neck: Yes: Supple


Cardiovascular: Yes: Regular Rate and Rhythm


Respiratory: Yes: CTA Bilaterally


Gastrointestinal: Yes: Hypoactive Bowel Sounds, Tenderness


Extremities: Yes: WNL


Neurological: Yes: Alert, Oriented


Labs: 


 INR, PTT











INR  1.16  (0.82-1.09)   05/11/17  07:50    














Problem List





- Problems


(1) Cholecystitis, acute with cholelithiasis


Assessment/Plan: 


npo, ivf


iv abx


surgery consult


Code(s): K80.00 - CALCULUS OF GALLBLADDER W ACUTE CHOLECYST W/O OBSTRUCTION   

Qualifiers: 


     Cholelithiasis location: gallbladder     Biliary obstruction: with biliary 

obstruction     Qualified Code(s): K80.01 - Calculus of gallbladder with acute 

cholecystitis with obstruction  








Assessment/Plan


 











s/p surgery


prn PAIN MEDS

## 2017-05-12 NOTE — PN
Progress Note, Physician


History of Present Illness: 


patient stable


doing well


awaiting surgery


pain still present





- Current Medication List


Current Medications: 


Active Medications





Acetaminophen (Ofirmev Injection -)  1,000 mg IVPB Q6H PRN


   Last Admin: 05/11/17 21:25 Dose:  1,000 mg


Hydromorphone HCl (Dilaudid Injection -)  1 mg IVPB Q4H PRN


   Last Admin: 05/11/17 15:15 Dose:  1 mg


Sodium Chloride (Normal Saline -)  1,000 mls @ 75 mls/hr IV ASDIR AMI


   Last Admin: 05/11/17 09:36 Dose:  75 mls/hr


Piperacillin Sod/Tazobactam Sod (Zosyn 3.375gm Ivpb (Pre-Docked))  50 mls @ 100 

mls/hr IVPB Q8H-IV AMI


   PRN Reason: Protocol


   Last Admin: 05/12/17 02:00 Dose:  100 mls/hr


Pantoprazole Sodium (Protonix 40mg Ivpb (Pre-Docked))  40 mg IVPB DAILY [facility]


   Last Admin: 05/11/17 09:33 Dose:  40 mg











- Objective


Vital Signs: 


 Vital Signs











Temperature  98.5 F   05/12/17 05:43


 


Pulse Rate  86   05/12/17 05:43


 


Respiratory Rate  19   05/12/17 08:18


 


Blood Pressure  80/44   05/12/17 05:43


 


O2 Sat by Pulse Oximetry (%)  100   05/12/17 08:18











Constitutional: Yes: No Distress, Calm


Cardiovascular: Yes: Regular Rate and Rhythm


Respiratory: Yes: Regular, CTA Bilaterally


Gastrointestinal: Yes: Tenderness, Other.  No: Tenderness, Rebound


Musculoskeletal: Yes: WNL


Extremities: Yes: WNL


Neurological: Yes: Alert, Oriented


Psychiatric: Yes: Alert, Oriented


Labs: 


 INR, PTT











INR  1.16  (0.82-1.09)   05/11/17  07:50    














Assessment/Plan


 Problems


(1) Cholecystitis, acute with cholelithiasis


Code(s): K80.00 - CALCULUS OF GALLBLADDER W ACUTE CHOLECYST W/O OBSTRUCTION   

Qualifiers: 


     Cholelithiasis location: gallbladder     Biliary obstruction: with biliary 

obstruction     Qualified Code(s): K80.01 - Calculus of gallbladder with acute 

cholecystitis with obstruction  





plan





continue abx


await for surgery


hydration


rest as per team

## 2017-05-12 NOTE — OP
Operative Note





- Note:


Operative Date: 05/12/17


Pre-Operative Diagnosis: acute cholecystitis


Operation: laparoscopic cholecystectomy


Findings: 


acute cholecystitis


Post-Operative Diagnosis: Same as Pre-op


Surgeon: Sameer Stock


Assistant: Coco Witt


Anesthesia: General


Estimated Blood Loss (mls): 10


Operative Report Dictated: Yes

## 2017-05-13 VITALS — DIASTOLIC BLOOD PRESSURE: 58 MMHG | HEART RATE: 90 BPM | SYSTOLIC BLOOD PRESSURE: 105 MMHG | TEMPERATURE: 98.4 F

## 2017-05-13 LAB
ALBUMIN SERPL-MCNC: 3.3 G/DL (ref 3.5–5)
ALP SERPL-CCNC: 76 U/L (ref 32–92)
ALT SERPL-CCNC: 18 U/L (ref 10–40)
ANION GAP SERPL CALC-SCNC: 9 MMOL/L (ref 8–16)
AST SERPL-CCNC: 31 U/L (ref 10–42)
BASOPHILS # BLD: 0.4 % (ref 0–2)
BILIRUB SERPL-MCNC: 0.6 MG/DL (ref 0.2–1)
CALCIUM SERPL-MCNC: 8.8 MG/DL (ref 8.4–10.2)
CO2 SERPL-SCNC: 23 MMOL/L (ref 22–28)
CREAT SERPL-MCNC: 0.5 MG/DL (ref 0.6–1.3)
DEPRECATED RDW RBC AUTO: 12.7 % (ref 11.6–15.6)
EOSINOPHIL # BLD: 0.3 % (ref 0–4.5)
GLUCOSE SERPL-MCNC: 89 MG/DL (ref 74–106)
MCH RBC QN AUTO: 30.4 PG (ref 25.7–33.7)
MCHC RBC AUTO-ENTMCNC: 34.4 G/DL (ref 32–36)
MCV RBC: 88.3 FL (ref 80–96)
NEUTROPHILS # BLD: 69.2 % (ref 42.8–82.8)
PLATELET # BLD AUTO: 262 K/MM3 (ref 134–434)
PMV BLD: 8 FL (ref 7.5–11.1)
PROT SERPL-MCNC: 6.1 G/DL (ref 6.4–8.3)
WBC # BLD AUTO: 8.7 K/MM3 (ref 4–10.8)

## 2017-05-13 RX ADMIN — PIPERACILLIN SODIUM,TAZOBACTAM SODIUM SCH MLS/HR: 3; .375 INJECTION, POWDER, FOR SOLUTION INTRAVENOUS at 02:00

## 2017-05-13 RX ADMIN — PIPERACILLIN SODIUM,TAZOBACTAM SODIUM SCH MLS/HR: 3; .375 INJECTION, POWDER, FOR SOLUTION INTRAVENOUS at 10:15

## 2017-05-13 RX ADMIN — ACETAMINOPHEN PRN MG: 10 INJECTION, SOLUTION INTRAVENOUS at 08:23

## 2017-05-13 RX ADMIN — PANTOPRAZOLE SODIUM SCH MG: 40 INJECTION, POWDER, FOR SOLUTION INTRAVENOUS at 10:14

## 2017-05-13 NOTE — OP
DATE OF OPERATION:  05/12/2017

 

PREOPERATIVE DIAGNOSIS:  Acute cholecystitis.

 

POSTOPERATIVE DIAGNOSIS:  Acute cholecystitis.

 

PROCEDURE:  Laparoscopic cholecystectomy.

 

SURGEON:  Sameer Stock MD

 

ASSISTANT:  TOM Vee

 

ANESTHESIA:  General by Tania Engle DO

 

COMPLICATIONS:  None.

 

BLOOD LOSS:  10 mL.

 

SPECIMEN:  Gallbladder.

 

CONDITION:  Patient tolerated the procedure well.

 

FINDINGS:  Acute cholecystitis.

 

This is a 41-year-old female who was admitted 2 days ago with right upper quadrant

abdominal pain, fever, elevated white count, and ultrasound showing a stone impacted

in the gallbladder neck.  A surgical consult was obtained.  The patient was admitted

to the hospital.  IV antibiosis was started and she is being taken to the operating

room for a laparoscopic cholecystectomy.  Risks and benefits discussed with the

patient.  Informed consent was obtained.  _____ was also performed.

 

In the operating room, she received IV antibiosis.  She was put in the supine

position.  After induction of general anesthesia, she was then prepped and draped in

the usual sterile fashion.  At this point then, a standard timeout was observed and

then the operation was begun with a paraumbilical incision which was carried through

the subcutaneous tissues using a scalpel.  Bleeding was controlled with cautery.  A

fascial incision with standard [place] approach was used to enter the peritoneal

cavity.  A 12-mm port was introduced and then insufflation to a pressure of 15 mmHg

was accomplished.

 

At this point then, a total of three 5-mm ports was introduced, 1 in the epigastrium

and 2 in the right upper quadrant.  The patient was then positioned in a reverse

Trendelenburg position.  The gallbladder was identified and retracted superiorly and

laterally.  There was some minimal inflammation of the gallbladder.  The infundibular

region of the gallbladder was then dissected with reflecting the peritoneal lining,

exposing the hilar structures.  The cystic duct and artery were apparent and they

were circumferentially dissected.  Care was taken to establish a critical view of

safety clearly with visualization of the triangle of Calot and visualization of the

cystic duct/common bile duct junction with clear identification of the common bile

duct both proximal and distal to the cystic duct junction.

 

The cystic artery was in the normal position within the triangle of Calot and the

liver bed and was clearly visualized within the critical view of safety in the

triangle of Calot as well.  At this point then, a timeout was observed again to

reestablish the anatomy and then the cystic duct was then doubly ligated with

Endoclips and divided with Endoshears.  The cystic artery was also doubly ligated

with Endoclips and divided with Endoshears and the gallbladder was then dissected off

the liver bed with the use of cautery using a hook dissector.  The gallbladder was

then completely dissected.  Care was taken to avoid injury to the liver bed to any of

the adjacent structures.  The gallbladder was placed in an EndoCatch bag and removed

through the umbilical port.

 

The right upper quadrant was irrigated and suctioned carefully.  Final check for

hemostasis was performed with hemostasis achieved with cautery.  Upon satisfactory

hemostasis and aspiration of the gallbladder fossa and right upper quadrant, the

ports were removed under direct vision.  The fascia at the umbilical port was closed

with 0 Vicryl sutures in interrupted fashion.  The port sites were all closed at the

skin level with 4-0 Monocryl.  Marcaine 0.25% was then infiltrated in all incisions. 

Dermabond was applied and the patient was returned to the recovery room awake and

alert in stable condition.  She tolerated the procedure well.

 

 

LAWRENCE [id number]

DD: 05/12/2017 18:18

DT: 05/13/2017 07:58

Job #:  [id number]

## 2017-05-13 NOTE — SURG
Surgery First Assist Note


First Assist: Coco Witt PA-C


Date of Service: 05/12/17


Diagnosis: 


 acute cholecystitis





Procedure: 





 laparoscopic cholecystectomy


I was present for the entirety of the operative procedure. For further detail, 

please refer to operative report.








Visit type





- Case Type


Case Type: ED Admission





- Emergency


Emergency Visit: Yes


ED Registration Date: 05/10/17


Care time: The patient presented to the Emergency Department on the above date 

and was hospitalized for further evaluation of their emergent condition.





- New patient


This patient is new to me today: Yes


Date on this admission: 05/13/17





- Critical Care


Critical Care patient: No

## 2017-05-13 NOTE — DS
Physical Examination


Vital Signs: 


 Vital Signs











Temperature  98.4 F   05/13/17 14:19


 


Pulse Rate  90   05/13/17 14:19


 


Respiratory Rate  18   05/13/17 14:19


 


Blood Pressure  105/58   05/13/17 14:19


 


O2 Sat by Pulse Oximetry (%)  100   05/13/17 14:19











Constitutional: Yes: No Distress


Eyes: Yes: WNL


HENT: Yes: WNL


Neck: Yes: WNL


Cardiovascular: Yes: WNL


Respiratory: Yes: WNL


Gastrointestinal: Yes: WNL


Renal/: Yes: WNL


Musculoskeletal: Yes: WNL


Extremities: Yes: WNL


Edema: No


Integumentary: Yes: WNL


Wound/Incision: Yes: Clean/Dry


Neurological: Yes: WNL


...Motor Strength: WNL


Psychiatric: Yes: WNL


Labs: 


 CBC, BMP





 05/13/17 07:15 





 05/13/17 07:15 











Discharge Summary


Reason For Visit: ACUTE CHOLECYSTITIS


Current Active Problems





Cholecystitis, acute with cholelithiasis (Acute) 


Sinus tachycardia (Acute) 








Procedures: Principal: LAPCHOL


Other Procedures: CT/SONO


Hospital Course: 


ADMITTED AND LAPCHOL PERFORMED TOLERATED WELL GIVEN IV ABX AND IVF, DC HOME F/U 

WITH PMD 2-3 DAYS


Condition: Fair





- Instructions


Diet, Activity, Other Instructions: 


SEE PMD 2-3 DAYS


Disposition: HOME





- Home Medications


Comprehensive Discharge Medication List: 


Ambulatory Orders





Norgestimate-Ethinyl Estradiol [Ortho Tri-Cyclen Lo Tablet] 1 each PO DAILY 02/ 01/17 


Multivitamin [Poly-Vitamin] 1 each PO DAILY 05/10/17 


Cefuroxime Axetil [Ceftin -] 500 mg PO BID #10 tablet 05/13/17

## 2017-05-13 NOTE — PN
Progress Note, Physician


History of Present Illness: 


patient stable


doing well


post surgery





- Current Medication List


Current Medications: 


Active Medications





Acetaminophen (Ofirmev Injection -)  1,000 mg IVPB Q6H PRN


   Last Admin: 05/13/17 08:23 Dose:  1,000 mg


Fentanyl (Sublimaze Injection -)  50 mcg IVPUSH [id number] PRN


   PRN Reason: PAIN


   Stop: 05/15/17 15:23


   Last Admin: 05/12/17 18:55 Dose:  50 mcg


Hydromorphone HCl (Dilaudid Injection -)  1 mg IVPB Q4H PRN


   Last Admin: 05/11/17 15:15 Dose:  1 mg


Sodium Chloride (Normal Saline -)  1,000 mls @ 75 mls/hr IV ASDIR AMI


   Last Admin: 05/11/17 09:36 Dose:  75 mls/hr


Piperacillin Sod/Tazobactam Sod (Zosyn 3.375gm Ivpb (Pre-Docked))  50 mls @ 100 

mls/hr IVPB Q8H-IV AMI


   PRN Reason: Protocol


   Last Admin: 05/13/17 10:15 Dose:  100 mls/hr


Lactated Ringer's (Lactated Ringers Solution)  1,000 mls @ 75 mls/hr IV ASDIR 

AMI


Ketorolac Tromethamine (Toradol Injection -)  30 mg IVPUSH Q6H PRN


   PRN Reason: PAIN


   Stop: 05/17/17 18:10


Oxycodone/Acetaminophen (Percocet 5/325 -)  1 combo PO Q6H PRN


   PRN Reason: PAIN LEVEL 6-10


Pantoprazole Sodium (Protonix 40mg Ivpb (Pre-Docked))  40 mg IVPB DAILY [facility]


   Last Admin: 05/13/17 10:14 Dose:  40 mg











- Objective


Vital Signs: 


 Vital Signs











Temperature  98.4 F   05/13/17 14:19


 


Pulse Rate  90   05/13/17 14:19


 


Respiratory Rate  18   05/13/17 14:19


 


Blood Pressure  105/58   05/13/17 14:19


 


O2 Sat by Pulse Oximetry (%)  100   05/13/17 14:19











Constitutional: Yes: No Distress, Calm


Cardiovascular: Yes: Regular Rate and Rhythm


Respiratory: Yes: Regular, CTA Bilaterally


Gastrointestinal: Yes: Normal Bowel Sounds, Soft, Other (post op pain)


Musculoskeletal: Yes: Other


Neurological: Yes: Alert, Oriented


Psychiatric: Yes: Alert, Oriented


Labs: 


 CBC, BMP





 05/13/17 07:15 





 05/13/17 07:15 





 INR, PTT











INR  1.16  (0.82-1.09)   05/11/17  07:50    














Assessment/Plan


 Problems


(1) Cholecystitis, acute with cholelithiasis


Code(s): K80.00 - CALCULUS OF GALLBLADDER W ACUTE CHOLECYST W/O OBSTRUCTION   

Qualifiers: 


     Cholelithiasis location: gallbladder     Biliary obstruction: with biliary 

obstruction     Qualified Code(s): K80.01 - Calculus of gallbladder with acute 

cholecystitis with obstruction  





uti





plan





can change abx


rest as per priamry team

## 2017-05-16 NOTE — PATH
Surgical Pathology Report



Patient Name:  ANJELICA ARAIZA

Accession #:  

Med. Rec. #:  [id number]                                                        

   /Age/Gender:  1975 (Age: 41) / F

Account:  [id number]                                                          

             Location: [facility] MED[facility]

Taken:  2017

Received:  2017

Reported:  2017

Physicians:  Claudy Morris M.D.

  



Specimen(s) Received

 GALLBLADDER 





Clinical History

Acute cholecystitis







Final Diagnosis

GALLBLADDER, CHOLECYSTECTOMY:

CHRONIC CHOLECYSTITIS, CHOLESTEROLOSIS, AND CHOLELITHIASIS.

BENIGN PERICYSTIC LYMPH NODE PRESENT.





***Electronically Signed***

Mauri Michaels M.D.





Gross Description

Received in formalin, labeled "gallbladder," is a 7.5 x 2.6 x 2.1 cm.

gallbladder with a 0.2 cm. in length portion of cystic duct attached. There is a

0.4 cm in greatest dimension tan-brown periductal lymph node present. The outer

surface is tan-gray and varies from smooth to shaggy. The lumen contains brown,

gelatinous bile as well as 3 yellow, irregular, bosselated choleliths ranging

from 0.6-1.4 cm in greatest dimension. The mucosa is brown with gold cholesterol

stippling. The wall of the gallbladder averages 0.1 cm. in thickness.

Representative sections are submitted in one cassette.  

DL/5/15/2017



saudi/5/15/2017

## 2017-05-17 PROBLEM — Z00.00 ENCOUNTER FOR PREVENTIVE HEALTH EXAMINATION: Status: ACTIVE | Noted: 2017-05-17

## 2017-05-23 ENCOUNTER — APPOINTMENT (OUTPATIENT)
Dept: SURGERY | Facility: CLINIC | Age: 42
End: 2017-05-23

## 2017-05-23 VITALS
HEART RATE: 96 BPM | WEIGHT: 152.13 LBS | OXYGEN SATURATION: 98 % | TEMPERATURE: 97.9 F | BODY MASS INDEX: 26.95 KG/M2 | DIASTOLIC BLOOD PRESSURE: 73 MMHG | HEIGHT: 63 IN | SYSTOLIC BLOOD PRESSURE: 106 MMHG

## 2017-06-02 ENCOUNTER — APPOINTMENT (OUTPATIENT)
Dept: SURGERY | Facility: CLINIC | Age: 42
End: 2017-06-02

## 2017-06-02 DIAGNOSIS — K80.20 CALCULUS OF GALLBLADDER W/OUT CHOLECYSTITIS W/OUT OBSTRUCTION: ICD-10-CM

## 2017-06-13 ENCOUNTER — APPOINTMENT (OUTPATIENT)
Dept: SURGERY | Facility: CLINIC | Age: 42
End: 2017-06-13

## 2017-10-07 ENCOUNTER — HOSPITAL ENCOUNTER (EMERGENCY)
Dept: HOSPITAL 74 - FER | Age: 42
Discharge: HOME | End: 2017-10-07
Payer: COMMERCIAL

## 2017-10-07 VITALS — HEART RATE: 93 BPM

## 2017-10-07 VITALS — BODY MASS INDEX: 27.1 KG/M2

## 2017-10-07 VITALS — SYSTOLIC BLOOD PRESSURE: 121 MMHG | TEMPERATURE: 99.1 F | DIASTOLIC BLOOD PRESSURE: 76 MMHG

## 2017-10-07 DIAGNOSIS — K60.2: Primary | ICD-10-CM
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
## 2020-02-16 ENCOUNTER — HOSPITAL ENCOUNTER (EMERGENCY)
Dept: HOSPITAL 74 - FER | Age: 45
Discharge: HOME | End: 2020-02-16
Payer: COMMERCIAL

## 2020-02-16 VITALS — HEART RATE: 115 BPM | TEMPERATURE: 97.7 F | DIASTOLIC BLOOD PRESSURE: 81 MMHG | SYSTOLIC BLOOD PRESSURE: 128 MMHG

## 2020-02-16 VITALS — BODY MASS INDEX: 26.9 KG/M2

## 2020-02-16 DIAGNOSIS — H65.02: Primary | ICD-10-CM

## 2020-02-16 NOTE — PDOC
History of Present Illness





- General


Chief Complaint: Ear Problem


Stated Complaint: LT EARACHE





- History of Present Illness


Initial Comments: 





02/16/20 06:39


This 44-year-old woman with no significant past medical history presents with 

left ear pain and fullness for the last several hours.  Patient has had several 

days of mild subjective fever, lightheadedness and diarrhea.  Yesterday, she 

noticed increasing sensation of fullness and decreased hearing in the left ear.

  Overnight, she began to have significant pain in the left ear (did not take 

any analgesics).  No discharge from the ear canal noted. 


 She had sinus congestion few weeks ago but has no facial discomfort or nasal 

discharge now.  There has been no sore throat.  No history of ear infections as 

an adult.





Medications as noted below


Allergies as noted below


History of cholecystectomy





Past History





- Past Medical History


Allergies/Adverse Reactions: 


 Allergies











Allergy/AdvReac Type Severity Reaction Status Date / Time


 


Sulfa (Sulfonamide Allergy   Verified 10/07/17 10:04





Antibiotics)     


 


ciprofloxacin AdvReac   Verified 10/07/17 10:04


 


latex AdvReac   Verified 10/07/17 10:04


 


nitrofurantoin AdvReac  Difficulty Verified 10/07/17 10:04





[From Macrobid]   Breathing  


 


nitrofurantoin AdvReac  Difficulty Verified 10/07/17 10:04





macrocrystalline   Breathing  





[From Macrobid]     











Home Medications: 


Ambulatory Orders





Norgestimate-Ethinyl Estradiol [Ortho Tri-Cyclen Lo Tablet] 1 each PO DAILY 02/ 01/17 


Multivitamin [Poly-Vitamin] 1 each PO DAILY 05/10/17 


Ascorbic Acid [Vitamin C -] 500 mg PO DAILY 10/07/17 


Cholecalciferol (Vitamin D3) [Vitamin D3] 2,000 unit PO DAILY 10/07/17 


Cranberry Fruit Extract [Cranberry] 0 mg PO DAILY 10/07/17 


Fluticasone Prop 0.05% Nasal [Flonase -] 1 - 2 spray NS DAILY #1 spray.pump 02/ 16/20 











- Psycho Social/Smoking Cessation Hx


Smoking History: Never smoked


Have you smoked in the past 12 months: No


Hx Alcohol Use: No


Drug/Substance Use Hx: No


Substance Use Type: None





**Review of Systems





- Review of Systems


Able to Perform ROS?: Yes


Comments:: 





12 point review of systems is negative except for what is noted in the history 

of present illness











*Physical Exam





- Physical Exam





GENERAL: Adult female, alert and oriented x3 in, in no acute distress


HEAD: Normal with no signs of trauma.


EYES: PERRLA, EOMI, sclera anicteric, conjunctiva clear.


ENT: Left earno edema or erythema of canal; TM retracted and opaque


       Right earcanal and TM normal


       Nares patent, oropharynx clear without exudates.  Dry mucous membranes.


NECK: Normal range of motion, supple without lymphadenopathy, JVD, or masses.


EXTREMITIES: Normal range of motion, no edema.  No clubbing or cyanosis. No 

erythema, or tenderness.


NEUROLOGICAL: Cranial nerves II through XII grossly intact.  Normal speech.  No 

focal neurological deficits. 


MUSCULOSKELETAL:  Back non-tender to palpation, no CVA tenderness


SKIN: Warm, Dry, normal turgor, no rashes or lesions noted.











Medical Decision Making





- Medical Decision Making





44-year-old woman presents with several hour history of left ear fullness and 

increasing pain over the last few hours.  No previous history of ear infection 

as an adult.  Although she has had sinus/nasal congestion over the last few 

weeks, she currently does not have significant facial pain or nasal discharge 

now.  She has had subjective fever and diarrhea for several days accompanied by 

lightheadedness.  Exam as noted with evidence of serous otitis media on the 

left side





Patient appears to have mild viral syndrome and may have a subacute sinusitis 

with accompanying edema.


Nasal steroid spray will be prescribed for a few days to decrease edema in the 

nasopharyngeal tissues; patient also takes Zyrtec for her allergies, and she 

can take this at this time also to decrease local edema


Since patient has had diarrhea for several days and lightheadedness, she should 

continue to drink plenty of fluids.


Acetaminophen/ibuprofen can be taken for ear associated with her serous otitis 

media.





If she has continued discomfort in the ear, she should follow-up with 

otolaryngologist ( referral information provided for her).  She 

should continue to to rest and not work overnight tonight.  She should plan to 

follow-up with her general doctor within the next 5 to 7 days.


If she has severe symptoms such as persistent pain or high fever, she should 

return to the ER








Discharge





- Discharge Information


Problems reviewed: Yes


Clinical Impression/Diagnosis: 


Serous otitis media


Qualifiers:


 Chronicity: acute Laterality: left Recurrence: non-recurrent Qualified Code(s)

: H65.02 - Acute serous otitis media, left ear





Condition: Stable


Disposition: HOME





- Additional Discharge Information


Prescriptions: 


Fluticasone Prop 0.05% Nasal [Flonase -] 1 - 2 spray NS DAILY #1 spray.pump





- Follow up/Referral


Referrals: 


Karen Easley MD [Primary Care Provider] - 


Juancho Holland MD [Staff Physician] - 1 week





- Patient Discharge Instructions


Patient Printed Discharge Instructions:  DI for Middle Ear Infection-Adult


Additional Instructions: 


Continue to drink plenty of fluids; rest


Acetaminophen/ibuprofen as needed for pain


Can use Zyrtec as needed daily


Flonase 2 sprays each nostril once daily for the next 5 days


Return if you have severe pain, fever


No work tonight


Follow-up with otolaryngologist () if you continue to have ear 

discomfort


Follow-up with your general medical doctor within the next 5 to 7 days





- Post Discharge Activity


Work/Back to School Note:  Back to Work

## 2021-09-26 ENCOUNTER — HOSPITAL ENCOUNTER (EMERGENCY)
Dept: HOSPITAL 74 - FER | Age: 46
Discharge: HOME | End: 2021-09-26
Payer: COMMERCIAL

## 2021-09-26 VITALS — HEART RATE: 111 BPM | SYSTOLIC BLOOD PRESSURE: 101 MMHG | DIASTOLIC BLOOD PRESSURE: 54 MMHG

## 2021-09-26 VITALS — TEMPERATURE: 99.9 F

## 2021-09-26 VITALS — BODY MASS INDEX: 24.7 KG/M2

## 2021-09-26 DIAGNOSIS — J32.9: Primary | ICD-10-CM

## 2021-09-26 DIAGNOSIS — R00.0: ICD-10-CM

## 2021-09-26 PROCEDURE — U0005 INFEC AGEN DETEC AMPLI PROBE: HCPCS

## 2021-09-26 PROCEDURE — U0003 INFECTIOUS AGENT DETECTION BY NUCLEIC ACID (DNA OR RNA); SEVERE ACUTE RESPIRATORY SYNDROME CORONAVIRUS 2 (SARS-COV-2) (CORONAVIRUS DISEASE [COVID-19]), AMPLIFIED PROBE TECHNIQUE, MAKING USE OF HIGH THROUGHPUT TECHNOLOGIES AS DESCRIBED BY CMS-2020-01-R: HCPCS

## 2021-09-26 PROCEDURE — C9803 HOPD COVID-19 SPEC COLLECT: HCPCS

## 2024-06-04 ENCOUNTER — NON-APPOINTMENT (OUTPATIENT)
Age: 49
End: 2024-06-04

## 2024-12-07 ENCOUNTER — NON-APPOINTMENT (OUTPATIENT)
Age: 49
End: 2024-12-07

## 2025-01-20 ENCOUNTER — NON-APPOINTMENT (OUTPATIENT)
Age: 50
End: 2025-01-20